# Patient Record
Sex: MALE | Race: WHITE | NOT HISPANIC OR LATINO | URBAN - METROPOLITAN AREA
[De-identification: names, ages, dates, MRNs, and addresses within clinical notes are randomized per-mention and may not be internally consistent; named-entity substitution may affect disease eponyms.]

---

## 2017-01-01 ENCOUNTER — OFFICE VISIT - RIVER FALLS (OUTPATIENT)
Dept: FAMILY MEDICINE | Facility: CLINIC | Age: 82
End: 2017-01-01

## 2017-01-01 ASSESSMENT — MIFFLIN-ST. JEOR
SCORE: 1654.09
SCORE: 1641.39
SCORE: 1645.02

## 2022-02-11 VITALS
HEART RATE: 80 BPM | SYSTOLIC BLOOD PRESSURE: 138 MMHG | WEIGHT: 221.4 LBS | HEIGHT: 71 IN | DIASTOLIC BLOOD PRESSURE: 66 MMHG | BODY MASS INDEX: 31 KG/M2 | TEMPERATURE: 97.7 F

## 2022-02-11 VITALS
SYSTOLIC BLOOD PRESSURE: 136 MMHG | DIASTOLIC BLOOD PRESSURE: 68 MMHG | TEMPERATURE: 97.7 F | WEIGHT: 222.2 LBS | OXYGEN SATURATION: 95 % | BODY MASS INDEX: 31.11 KG/M2 | HEIGHT: 71 IN | HEART RATE: 80 BPM

## 2022-02-11 VITALS
WEIGHT: 224.2 LBS | BODY MASS INDEX: 31.39 KG/M2 | TEMPERATURE: 98.4 F | SYSTOLIC BLOOD PRESSURE: 136 MMHG | HEIGHT: 71 IN | DIASTOLIC BLOOD PRESSURE: 64 MMHG | HEART RATE: 78 BPM | OXYGEN SATURATION: 94 %

## 2022-02-11 VITALS
DIASTOLIC BLOOD PRESSURE: 61 MMHG | HEART RATE: 68 BPM | SYSTOLIC BLOOD PRESSURE: 118 MMHG | TEMPERATURE: 98.6 F | BODY MASS INDEX: 30.71 KG/M2 | WEIGHT: 220.2 LBS

## 2022-02-16 NOTE — CARE COORDINATION
Patient:   WYATT RODRIGUEZ            MRN: [id number]            FIN: [id number]               Age:   89 years     Sex:  Male     :  1923   Associated Diagnoses:   None   Author:   Kourtney Landaverde CMA      Sources of Information:  [ ] Patient, family member, or caregiver (Please list):  [ x] Hospital discharge summary  [ ] Hospital fax  [ ] List of recent hospitalizations or ED visits  [ ] Other:     Discharged From: [facility] [place]  Discharge Date: 17    Diagnosis/Problem: Dizziness    Medication Changes: [ x] Yes [ ] No   Medication List Updated: [x ] Yes [ ] No    Needs Referral or Lab: [x ] Yes [ ] No   needs outpatient PT    Needs Follow-up Appointment:  [x ] Within 7 days of discharge (highly complex visit)  [ ] Within 14 days of discharge (moderately complex visit)    Appointment Made With: CHT  Date: 17    Called and spoke to pts daughter (that is the phone # we have liste d in pt's chart). She states he is donig OK-a little tired. They were able to  amlodipine but will need refill when they come in as they only received small supply. Confirmed appt for . Advised to call if they need anything-she agreed.appt completed

## 2022-02-16 NOTE — CARE COORDINATION
Patient:   WYATT RODRIGUEZ            MRN: [id number]            FIN: [id number]               Age:   89 years     Sex:  Male     :  1923   Associated Diagnoses:   None   Author:   Tammy Painter CMA      Care Coordinator ER Discharge Note      Sources of Information:  [X ] Patient, family member, or caregiver (Please list): Spoke with Wyatt's daughter.  He is doing well.  They have a f/u appointment se[place] for this Thrusday. She had just spoke with Dad, he is doing well he has no questions or concerns.  They have CC number if anything chagnes.  She verbalized understanding and agreed.    [ ] Hospital discharge summary  [ ] Hospital fax  [X ] List of recent hospitalizations or ED visits  [ ] Other:     Discharged From: [facility] ED to assistend living in [place]  Discharge Date: 17    Diagnosis/Problem: faint, syncope spells    Medication Changes: [ ] Yes [ X] No   Medication List Updated: [ ] Yes [ X] No    Needs Referral or Lab: [ ] Yes [ X] No    Needs Follow-up Appointment:  [X ] Within 7 days of discharge (highly complex visit)  [ ] Within 14 days of discharge (moderately complex visit)    Appointment Made With: Kannan  Date: 17    Additional Information Needed and Requested:  [ ] Yes:  [ X] No

## 2022-02-16 NOTE — PROGRESS NOTES
Patient:   WYATT RODRIGUEZ            MRN: [id number]            FIN: [id number]               Age:   89 years     Sex:  Male     :  1923   Associated Diagnoses:   Syncope   Author:   Tino Parks MD      Chief Complaint   2017 10:59 AM CST   f/u ER - syncope     Chief complaint and symptoms noted above confirmed with patient.      History of Present Illness             The patient presents with syncope.  The syncopal episode lasted for 5 minutes and on Saturday.  Symptoms preceding the syncopal episode consist of none.  Events associated to the syncope a loss of consciousness, denies seizure activity, denies loss of bowel control and denies loss of bladder control.  Injury associated to the syncopal episode consists of none.  Exacerbating factors consist of none.  Relieving factors consist of none.  Associated symptoms consist of none.        Review of Systems   Constitutional:  Negative except as documented in history of present illness.    Ear/Nose/Mouth/Throat:  Negative.    Respiratory:  Negative.    Cardiovascular:  Negative except as documented in history of present illness.    Gastrointestinal:  Negative.    Genitourinary:  Negative.    Endocrine:  Negative.    Immunologic:  Negative.    Musculoskeletal:  Negative except as documented in history of present illness.    Neurologic:  Negative except as documented in history of present illness.    Psychiatric:  Negative except as documented in history of present illness.       Health Status   Allergies:    Allergic Reactions (Selected)  No Known Medication Allergies   Medications:  (Selected)   Prescriptions  Prescribed  Vitamin B12 1000 mcg/mL injectable solution: ( 1,000 mcg ), im, qmonth, Instructions: syringes as well, # 10 mL, 11 Refill(s), Type: Maintenance, Pharmacy: [facility], [place], 1,000 mcg im qmonth,Instr:syringes as well  amLODIPine 5 mg oral tablet: See Instructions, Instructions: TAKE ONE TABLET BY MOUTH ONE TIME DAILY  , # 30 tab(s), Type: Soft Stop, Pharmacy: [id number], TAKE ONE TABLET BY MOUTH ONE TIME DAILY  ferrous sulfate 325 mg (65 mg elemental iron) oral tablet: See Instructions, Instructions: Take one tablet by mouth twice daily, # 180 tab(s), Type: Soft Stop, Pharmacy: [facility] #[id number]   Problem list:    All Problems (Selected)  HTN (hypertension) / SNOMED CT 6659754185 / Confirmed  Anemia / SNOMED CT 839703438 / Confirmed  Radiculopathy / SNOMED CT 678843363 / Confirmed  Obese / ICD-9-.00 / Probable      Histories   Past Medical History:    Active  Radiculopathy (SNOMED CT 686359842): Onset on 7/9/2009 at 86 years.  Comments:  3/11/2011 CST 8:40 AM CST - Meka Knapp  Right L4-5 radiculopathy with foraminal stenosis  HTN (hypertension) (SNOMED CT 5127457297)  Resolved  Inpatient stay (SNOMED CT 735201779): Onset on 8/28/2017 at 89 years.  Resolved on 8/29/2017 at 89 years.  Comments:  8/30/2017 CDT 10:02 AM Sidra Rowland  @[facility], [place] - Dizziness  Inpatient stay (SNOMED CT 248137656): Onset on 8/8/2015 at 89 years.  Resolved on 8/10/2015 at 89 years.  Comments:  8/30/2017 CDT 10:01 AM Sidra Rowland  @[facility], [place] - Anemia, weakness  Left shoulder pain (SNOMED CT 90861398): Onset on 5/27/2015 at 89 years.  Resolved.  Vertigo (SNOMED CT 1330333593): Onset on 5/19/2015 at 89 years.  Resolved on 3/2/2017 at 89 years.  Inpatient stay (SNOMED CT 531656076): Onset on 8/8/2012 at 89 years.  Resolved.  Comments:  8/30/2017 CDT 10:00 AM Sidra Rowland  @[facility], [place] - LLL pneumonia   Family History:    Diabetes mellitus  Daughter  [place] - Cancer  Son  Comments:  6/3/2015 11:35 AM - Emily Rivas  skin        Physical Examination   Vital Signs   11/9/2017 10:59 AM CST Temperature Tympanic 98.4 DegF     Peripheral Pulse Rate 78 bpm     Pulse Site Radial artery     HR Method Manual     Systolic Blood Pressure 136 mmHg     Diastolic Blood  Pressure 64 mmHg (Modified)    Mean Arterial Pressure 88 mmHg (Modified)    BP Site Left arm     BP Method Manual     Oxygen Saturation 94 %       Measurements from flowsheet : Measurements   11/9/2017 10:59 AM CST Height Measured - Standard 71 in    Weight Measured - Standard 224.2 lb    BSA 2.25 m2    Body Mass Index 31.27 kg/m2      General:  Alert and oriented, No acute distress.    Eye:  Normal conjunctiva.    HENT:  Normocephalic.    Neck:  Supple, Non-tender.    Respiratory:  Lungs are clear to auscultation.    Cardiovascular:  Normal rate, Regular rhythm.    Gastrointestinal:  Soft, Non-tender.    Genitourinary:  No costovertebral angle tenderness.    Lymphatics:  No lymphadenopathy neck, axilla, groin.    Musculoskeletal:  Normal range of motion, Normal strength, No tenderness.    Integumentary:  Warm, Dry, Pink.    Neurologic:  Alert, Oriented, Normal sensory.    Psychiatric:  Cooperative, Appropriate mood & affect, Normal judgment, Non-suicidal.       Review / Management   Results review:  Reviewed ER note from [facility].       Impression and Plan   Diagnosis     Syncope ([id number]).     Course:  Improving, Progressing as expected.    Orders     Will monitor for now.  Declines further work up..

## 2022-02-16 NOTE — PROGRESS NOTES
Chief Complaint  c/o dizzy spells x ongoing; PT doesn't feel it is vertigo  History of Present Illness  Patient comes in follow-up of dizziness. ?Has not gotten any worse but it has not gotten any better either. ?He has a funny feeling around the top of his head. ?Almost like wearing a crown. ?  Review of Systems  Review of systems shows no fever chills or night sweats. ?He has no nausea or vomiting with this. ?He has not fallen but that is mostly because he is using a walker. ?Physical therapy note is reviewed.  Problem List/Past Medical History  Ongoing  Anemia  Obese  Radiculopathy  Resolved  *Hospitalized@[facility] - Anemia, weakness  *Hospitalized@[facility] - LLL pneumonia  Left shoulder pain  Vertigo  Procedure/Surgical History  History of Laminectomy (2008),  Arthropathy of Knee (1960),  neck surgery.  Home Medications  ferrous sulfate 325 mg (65 mg elemental iron) oral delayed release tablet, 325 mg, 1 tab(s), po, bid, 3 refills  Vitamin B12 1000 mcg/mL injectable solution, 1000 mcg, im, qmonth, 1 refills  Allergies  No Known Medication Allergies  Social History  Nutrition and Health  Caffeine intake amount: 2 cups caffeinated beverage per day..  Tobacco - Current  Snuff  Family History  CA - Cancer: Son.  Diabetes mellitus: Daughter.  Immunizations  Physical Exam  Vitals & Measurements  T:?97.7?(Tympanic)?  HR:?80?(Peripheral)?  BP:?136/68?  SpO2:?95%?  HT:?71?in?  WT:?222.2?lb?  BMI:?30.99?  Physical exam shows vital signs stable afebrile no apparent distress.  HEENT shows TMs are clear, no inflammation, nose clear pharynx moist  Neck supple  Lungs clear to A&P  CV regular, no murmur  Extremities no edema.  Neurologic exam shows he has decreased range of motion in his neck he also has stiffness throughout and has a somewhat shuffling gait. ?He does use his walker which helps significantly.  Lab Results  Diagnostic Results  Assessment/Plan  Dizziness  We will check lab work below. ?We will put physical therapy on  hold for now. ?We will also get CT scan because of this feeling he has on the crown of his head. ?We will see him back afterwards.  Better than half of the 25 minute visit was spent counseling on the above problems.  Ordered:  CBC w/o Diff (Request)  Comprehensive Metabolic Panel (Request)  CT Head w/o Contrast (Request)  Sedimentation Rate, Westergren (Request)  Thyroid Stimulating Hormone (Request)

## 2022-02-16 NOTE — PROGRESS NOTES
Chief Complaint    [facility], has not completely gone away  History of Present Illness      Patient comes in follow-up hospitalization.  He is doing well.  They do note that his blood pressure does drop from 150-100 systolic on the amlodipine 5 mg.  Some concern that this may be the cause of his dizziness.  He does take it at bedtime and have encouraged him to continue with that way.  Review of Systems      Review of systems is otherwise negative for any chest pain, shortness of breath, nausea vomiting or diarrhea.  Physical Exam   Vitals & Measurements    T: 98.6(Tympanic)  HR: 68(Peripheral)  BP: 104/56     WT: 220.2 lb       Physical exam shows vital signs stable afebrile no apparent distress blood pressure is on the low side though his pulses controlled.       HEENT shows he has some irritation in his ears and they do bother him.  I will have his senior living center use some 1% hydrocortisone as needed.       Neck is supple no bruits       Lungs clear       CV regular rate rhythm without murmur  Assessment/Plan      impression:       Dizziness possibly related to blood pressure.  Will cut his amlodipine in half.  I did review the MRI at the hospital and went over the results with the patient and family       Irritation to the ears.  Will use 1% hydrocortisone as needed.       Better than half of a 25 minute visit was spent counseling on the above problems.  Problem List/Past Medical History    Ongoing     Anemia     HTN (hypertension)     Obese     Radiculopathy    Historical     Inpatient stay     Inpatient stay     Inpatient stay     Left shoulder pain     Vertigo  Procedure/Surgical History     42710 unlisted px skin muc membrane +subq tissue (Charge) (03/02/2017)     90342 unlisted px skin muc membrane +subq tissue (Charge) (11/02/2016)     18314 unlisted px skin muc membrane +subq tissue (Charge) (04/07/2016)     History of Laminectomy (2008)     Arthropathy of Knee (1960)     neck  surgery  Medications    amLODIPine 5 mg oral tablet, 2.5 mg= 0.5 tab(s), po, daily    ferrous sulfate 325 mg (65 mg elemental iron) oral tablet, See Instructions    Vitamin B12 1000 mcg/mL injectable solution, 1000 mcg, im, qmonth, 11 refills  Allergies    No Known Medication Allergies  Social History    Smoking Status - 09/01/2017     Never smoker     Alcohol - Denies Alcohol Use, 06/03/2015     Employment and Education - 06/03/2015      Retired     Nutrition and Health - 06/03/2015      Caffeine intake amount: 2 cups caffeinated beverage per day..     Tobacco - Current, 06/03/2015      Snuff  Family History    CA - Cancer: Son.    Diabetes mellitus: Daughter.  Immunizations      Vaccine Date Status Comments      pneumococcal (PCV13) 09/24/2015 Given [9/24/2015] Pt consented.      influenza virus vaccine, inactivated 09/24/2015 Given [9/24/2015] Pt consented.      influenza virus vaccine, inactivated 10/17/2013 Recorded WIR      Td 06/04/2007 Recorded WIR  Lab Results      Results (Last 90 days)      No results located.

## 2022-02-16 NOTE — PROGRESS NOTES
"   Patient:   WYATT RAMÍREZ            MRN: [id number]            FIN: [id number]               Age:   89 years     Sex:  Male     :  1923   Associated Diagnoses:   None   Author:   Tino Parks MD      Subjective   Chief complaint 3/2/2017 8:45 AM CST     c/o dizzy spells, spot on L ear.       Mr. Wyatt Ramírez is a 89 year old gentleman with history of chronic anemia who presents to clinic for increasing dizziness over the past couple of weeks and a lesion on his left ear.  Wyatt was diagnosed with anemia back in ; etiology is likely GI blood loss, but he elected toforego colonoscopy or other diagnostic workup that may impair his quality of life in favor of symptomatic management.  He has been on ferrous sulfate and vitamin B12 shots since that time.  Approximately 1 month ago, Wyatt reduced his ferrous sulfate 325 mg tabs from twice daily to once daily due to issues with constipation.  Since that time, he has noted increasing dizziness both when seated and with positional changes.  He says it feels like his vision \"gets hazy.\"  Denies feeling like the room is spinning around him.      Wyatt also notes a lesion on his left ear that has been present for hte past couple of months but seems to be growing in size.  He \"broke a piece off\" a couple of weeks ago and it has been more painful since that time.  It causes him discomfort when he lays on his left side in bed.      Finally, Wyatt notes that he has a scab on his right hip that the nursing staff suggested he get checked out.  It is occasionally itchy but not painful.  No particular treatment has been initiated up to this point.     Denies recent falls.  His appetite has been somewhat less lately simply to certain foods \"not sounding good\", but his weight is holding steady.        Health Status   Allergies:    Allergic Reactions (Selected)  No Known Medication Allergies   Medications:  (Selected)   Prescriptions  Prescribed  Vitamin B12 1000 mcg/mL " injectable solution: ( 1,000 mcg ), im, qmonth, Instructions: syringes as well, # 10 mL, 1 Refill(s), Type: Maintenance  ferrous sulfate 325 mg (65 mg elemental iron) oral delayed release tablet: 1 tab(s) ( 325 mg ), PO, bid, # 180 tab(s), 3 Refill(s), Type: Maintenance, 1 tab(s) po bid,x90 day(s)   Problem list:    All Problems (Selected)  Anemia / SNOMED CT 838589595 / Confirmed  Radiculopathy / SNOMED CT 348404257 / Confirmed  Obese / ICD-9-.00 / Probable  Vertigo / SNOMED CT 6510978617 / Confirmed      Objective   Vital Signs   3/2/2017 8:45 AM CST Temperature Tympanic 97.7 DegF  LOW    Peripheral Pulse Rate 80 bpm    Pulse Site Radial artery    HR Method Manual    Systolic Blood Pressure 138 mmHg    Diastolic Blood Pressure 66 mmHg    Mean Arterial Pressure 90 mmHg    BP Site Left arm    BP Method Manual      Measurements from flowsheet : Measurements   3/2/2017 8:45 AM CST Height Measured - Standard 71 in    Weight Measured - Standard 221.4 lb    BSA 2.24 m2    Body Mass Index 30.88 kg/m2      General: Alert, oriented elderly gentleman.  Well-groomed, appears well-nourished. Interacts appropriately with daughter seated at side.  CV: Regular rate and rhythm. No murmurs, rubs, or gallops.  Pulm: Lungs clear to auscultation bilaterally. No wheezes or crackles.   Skin: ~2cm x ~1cm rasied, flesh-colored lesion on superior left ear with darkened central spot likely from prior bleeding.  Lateral right hip with ~3cm x ~4cm xerotic erythematous plaque; no warmth, erythema, or surrounding skin changes.    Psych: Affect bright.       Results Review   H.1  MCV 88.4  WBC 7.4  Plt 290      Impression and Plan     1. Left ear lesion, actinic keratosis vs. squamous cell carcinoma  -Cryotherapy in clinic today.      2. Dizzy spells: Hg WNL today at 13.1.  Anemia not likely etiology.  Given trigger of positional changes of his head, suspect BPPV 2/2 inner ear pathology/otolith.    -Referral placed for Physical  Therapy.  -Ensure adequate hydration to prevent orthostatic component of dizzy spells.     3. Chronic blood loss anemia   -Continue ferrous sulfate at reduced dose of 325 mg once daily.     4. Right hip xerotic plaque   -Apply OTC topical barrier cream such as bacitracin or Neosporin PRN to affected area to treat irritation.      Return to clinic in approximately 2 months or sooner if needed.     Angelia Pires, Medical Student     Patient seen and examined and treated by myself. Note prepared by student and reviewed.    Patient seen and personally examined by myself.  Discussed and agree with diagnosis and plan.